# Patient Record
(demographics unavailable — no encounter records)

---

## 2025-02-05 NOTE — PHYSICAL EXAM
[Normal Breath Sounds] : Normal breath sounds [Normal Heart Sounds] : normal heart sounds [No Rash or Lesion] : No rash or lesion [Oriented to Person] : oriented to person [Oriented to Place] : oriented to place [Oriented to Time] : oriented to time [Calm] : calm [de-identified] : Abdomen soft and ND  [de-identified] : NAD [de-identified] : NCAT [de-identified] : + ROM

## 2025-02-05 NOTE — HISTORY OF PRESENT ILLNESS
[FreeTextEntry1] : Buffy Carpio is a 66 y/o F who presents with complaints of noting painful mass to top of anus. Reports first noting this about two months ago. Her PCP advised watching and prescribed a course of antibiotics with no changes. She notes the pain is worse when she is sitting, and it is a solid consistency with no fluctuation or redness. Her last colonoscopy was in 2023 with [facility] and was of normal study.  Patient denies abdominal pain, nausea/vomiting, diarrhea, constipation and bleeding. She has no history of colon cancer in her family. She has had intentional weight loss due to mounjaro and DM diagnosis.

## 2025-02-05 NOTE — ASSESSMENT
[FreeTextEntry1] : Very pleasant 65-year-old female who presents complaining of a lump in the perianal region.  She noticed this a few months ago and described it as mobile but now feels that it is in the same location.  She has never had any rectal bleeding or drainage.  She did have a colonoscopy a year and a half ago which was unremarkable.  Patient was examined in the left lateral decubitus position.  Inspection of the anorectal area is unremarkable.  External palpation reveals no palpable mass whatsoever.  Digital exam is negative.  Sigmoidoscopy to 10 cm is negative with the mucosa appearing pink and no lesions noted with no inflammation.  Anoscopy is unremarkable.  I had the patient examine herself and she does not feel this lump.  I reassured the patient that at this point I see no pathology and feel no pathology.  Should this problem recur, I asked her to please come back so I may reevaluate.